# Patient Record
Sex: MALE | Race: OTHER | NOT HISPANIC OR LATINO | URBAN - METROPOLITAN AREA
[De-identification: names, ages, dates, MRNs, and addresses within clinical notes are randomized per-mention and may not be internally consistent; named-entity substitution may affect disease eponyms.]

---
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
## 2023-11-02 NOTE — ED PEDIATRIC NURSE NOTE - MODE OF DISCHARGE
"Called pt to inform him that inguinal hernia repair scheduled for tomorrow has been cancelled by anesthesiologist, Dr. Caceres, due to cardiac clearance stating, "moderate to high risk" and "avoid prolonged general anesthesia". Encouraged pt to follow up with his cardiologist regarding his abnormal stress test to see if improved cardiac function is possible to reschedule hernia surgery in the future. Pt verbalized understanding.   " Ambulatory
